# Patient Record
Sex: FEMALE | Race: WHITE | NOT HISPANIC OR LATINO | Employment: FULL TIME | URBAN - METROPOLITAN AREA
[De-identification: names, ages, dates, MRNs, and addresses within clinical notes are randomized per-mention and may not be internally consistent; named-entity substitution may affect disease eponyms.]

---

## 2018-08-28 PROCEDURE — 87624 HPV HI-RISK TYP POOLED RSLT: CPT | Performed by: OBSTETRICS & GYNECOLOGY

## 2018-08-28 PROCEDURE — G0145 SCR C/V CYTO,THINLAYER,RESCR: HCPCS | Performed by: OBSTETRICS & GYNECOLOGY

## 2018-08-29 ENCOUNTER — LAB REQUISITION (OUTPATIENT)
Dept: LAB | Facility: HOSPITAL | Age: 58
End: 2018-08-29
Payer: COMMERCIAL

## 2018-08-29 DIAGNOSIS — Z11.51 ENCOUNTER FOR SCREENING FOR HUMAN PAPILLOMAVIRUS (HPV): ICD-10-CM

## 2018-08-29 DIAGNOSIS — Z01.419 ENCOUNTER FOR GYNECOLOGICAL EXAMINATION WITHOUT ABNORMAL FINDING: ICD-10-CM

## 2018-09-04 LAB
HPV HR 12 DNA CVX QL NAA+PROBE: NEGATIVE
HPV16 DNA CVX QL NAA+PROBE: NEGATIVE
HPV18 DNA CVX QL NAA+PROBE: NEGATIVE

## 2018-09-05 LAB
LAB AP GYN PRIMARY INTERPRETATION: NORMAL
Lab: NORMAL

## 2019-09-03 ENCOUNTER — ANNUAL EXAM (OUTPATIENT)
Dept: OBGYN CLINIC | Facility: CLINIC | Age: 59
End: 2019-09-03
Payer: COMMERCIAL

## 2019-09-03 VITALS
SYSTOLIC BLOOD PRESSURE: 110 MMHG | HEIGHT: 71 IN | WEIGHT: 161.5 LBS | BODY MASS INDEX: 22.61 KG/M2 | DIASTOLIC BLOOD PRESSURE: 60 MMHG

## 2019-09-03 DIAGNOSIS — Z01.419 ENCOUNTER FOR WELL WOMAN EXAM: Primary | ICD-10-CM

## 2019-09-03 DIAGNOSIS — Z78.0 POSTMENOPAUSAL: ICD-10-CM

## 2019-09-03 DIAGNOSIS — Z12.39 SCREENING FOR BREAST CANCER: ICD-10-CM

## 2019-09-03 PROCEDURE — S0612 ANNUAL GYNECOLOGICAL EXAMINA: HCPCS | Performed by: PHYSICIAN ASSISTANT

## 2019-09-03 RX ORDER — CALCIUM CITRATE/VITAMIN D3 200MG-6.25
2 TABLET ORAL DAILY
COMMUNITY

## 2019-09-03 RX ORDER — LORAZEPAM 0.5 MG/1
TABLET ORAL
COMMUNITY
Start: 2019-06-17

## 2019-09-03 NOTE — PROGRESS NOTES
The patient is here for a yearly  The patient is not due for a pap smear  The patient has no bleeding or cramping  No vaginal or urinary issues  No breast concerns

## 2020-06-08 ENCOUNTER — OFFICE VISIT (OUTPATIENT)
Dept: URGENT CARE | Facility: CLINIC | Age: 60
End: 2020-06-08
Payer: COMMERCIAL

## 2020-06-08 VITALS
TEMPERATURE: 97.8 F | SYSTOLIC BLOOD PRESSURE: 160 MMHG | RESPIRATION RATE: 16 BRPM | DIASTOLIC BLOOD PRESSURE: 82 MMHG | HEART RATE: 76 BPM | WEIGHT: 161 LBS | HEIGHT: 72 IN | BODY MASS INDEX: 21.81 KG/M2 | OXYGEN SATURATION: 99 %

## 2020-06-08 DIAGNOSIS — N30.01 ACUTE CYSTITIS WITH HEMATURIA: ICD-10-CM

## 2020-06-08 DIAGNOSIS — R39.9 UTI SYMPTOMS: Primary | ICD-10-CM

## 2020-06-08 LAB
SL AMB  POCT GLUCOSE, UA: NEGATIVE
SL AMB LEUKOCYTE ESTERASE,UA: ABNORMAL
SL AMB POCT BILIRUBIN,UA: NEGATIVE
SL AMB POCT BLOOD,UA: ABNORMAL
SL AMB POCT CLARITY,UA: CLEAR
SL AMB POCT COLOR,UA: ABNORMAL
SL AMB POCT KETONES,UA: NEGATIVE
SL AMB POCT NITRITE,UA: NEGATIVE
SL AMB POCT PH,UA: 6
SL AMB POCT SPECIFIC GRAVITY,UA: 1.01
SL AMB POCT URINE PROTEIN: NEGATIVE
SL AMB POCT UROBILINOGEN: 0.2

## 2020-06-08 PROCEDURE — 87186 SC STD MICRODIL/AGAR DIL: CPT | Performed by: NURSE PRACTITIONER

## 2020-06-08 PROCEDURE — 81002 URINALYSIS NONAUTO W/O SCOPE: CPT | Performed by: NURSE PRACTITIONER

## 2020-06-08 PROCEDURE — 87086 URINE CULTURE/COLONY COUNT: CPT | Performed by: NURSE PRACTITIONER

## 2020-06-08 PROCEDURE — 99213 OFFICE O/P EST LOW 20 MIN: CPT | Performed by: NURSE PRACTITIONER

## 2020-06-08 PROCEDURE — 87077 CULTURE AEROBIC IDENTIFY: CPT | Performed by: NURSE PRACTITIONER

## 2020-06-08 RX ORDER — PHENAZOPYRIDINE HYDROCHLORIDE 100 MG/1
100 TABLET, FILM COATED ORAL 3 TIMES DAILY PRN
Qty: 10 TABLET | Refills: 0 | Status: SHIPPED | OUTPATIENT
Start: 2020-06-08 | End: 2020-07-11

## 2020-06-08 RX ORDER — NITROFURANTOIN 25; 75 MG/1; MG/1
100 CAPSULE ORAL 2 TIMES DAILY
Qty: 14 CAPSULE | Refills: 0 | Status: SHIPPED | OUTPATIENT
Start: 2020-06-08 | End: 2020-07-11

## 2020-06-10 LAB — BACTERIA UR CULT: ABNORMAL

## 2020-07-11 ENCOUNTER — OFFICE VISIT (OUTPATIENT)
Dept: URGENT CARE | Facility: CLINIC | Age: 60
End: 2020-07-11

## 2020-07-11 VITALS
OXYGEN SATURATION: 100 % | BODY MASS INDEX: 20.99 KG/M2 | DIASTOLIC BLOOD PRESSURE: 73 MMHG | RESPIRATION RATE: 16 BRPM | HEART RATE: 86 BPM | WEIGHT: 155 LBS | TEMPERATURE: 98 F | SYSTOLIC BLOOD PRESSURE: 132 MMHG | HEIGHT: 72 IN

## 2020-07-11 DIAGNOSIS — L03.031 CELLULITIS OF TOE OF RIGHT FOOT: Primary | ICD-10-CM

## 2020-07-11 RX ORDER — DOXYCYCLINE 100 MG/1
100 CAPSULE ORAL 2 TIMES DAILY
Qty: 14 CAPSULE | Refills: 0 | Status: SHIPPED | OUTPATIENT
Start: 2020-07-11 | End: 2020-07-18

## 2020-07-11 NOTE — PATIENT INSTRUCTIONS
Doxycycline twice a day for 7 day - take with a full glass of water and remain upright for 30 min after taking   While taking Doxycycline use sun protection as this medication can make you more sensitive to sunburn  Topical hydrocortisone cream as needed  Ice for comfort   Follow up with your PCP    Cellulitis   WHAT YOU NEED TO KNOW:   Cellulitis is a skin infection caused by bacteria  Cellulitis may go away on its own or you may need treatment  Your healthcare provider may draw a [place] around the outside edges of your cellulitis  If your cellulitis spreads, your healthcare provider will see it outside of the [place]  DISCHARGE INSTRUCTIONS:   Call 911 if:   · You have sudden trouble breathing or chest pain  Return to the emergency department if:   · Your wound gets larger and more painful  · You feel a crackling under your skin when you touch it  · You have purple dots or bumps on your skin, or you see bleeding under your skin  · You have new swelling and pain in your legs  · The red, warm, swollen area gets larger  · You see red streaks coming from the infected area  Contact your healthcare provider if:   · You have a fever  · Your fever or pain does not go away or gets worse  · The area does not get smaller after 2 days of antibiotics  · Your skin is flaking or peeling off  · You have questions or concerns about your condition or care  Medicines:   · Antibiotics  help treat the bacterial infection  · NSAIDs , such as ibuprofen, help decrease swelling, pain, and fever  NSAIDs can cause stomach bleeding or kidney problems in certain people  If you take blood thinner medicine, always ask if NSAIDs are safe for you  Always read the medicine label and follow directions  Do not give these medicines to children under 10months of age without direction from your child's healthcare provider  · Acetaminophen  decreases pain and fever  It is available without a doctor's order  Ask how much to take and how often to take it  Follow directions  Read the labels of all other medicines you are using to see if they also contain acetaminophen, or ask your doctor or pharmacist  Acetaminophen can cause liver damage if not taken correctly  Do not use more than 4 grams (4,000 milligrams) total of acetaminophen in one day  · Take your medicine as directed  Contact your healthcare provider if you think your medicine is not helping or if you have side effects  Tell him or her if you are allergic to any medicine  Keep a list of the medicines, vitamins, and herbs you take  Include the amounts, and when and why you take them  Bring the list or the pill bottles to follow-up visits  Carry your medicine list with you in case of an emergency  Self-care:   · Elevate the area above the level of your heart  as often as you can  This will help decrease swelling and pain  Prop the area on pillows or blankets to keep it elevated comfortably  · Clean the area daily until the wound scabs over  Gently wash the area with soap and water  Pat dry  Use dressings as directed  · Place cool or warm, wet cloths on the area as directed  Use clean cloths and clean water  Leave it on the area until the cloth is room temperature  Pat the area dry with a clean, dry cloth  The cloths may help decrease pain  Prevent cellulitis:   · Do not scratch bug bites or areas of injury  You increase your risk for cellulitis by scratching these areas  · Do not share personal items, such as towels, clothing, and razors  · Clean exercise equipment  with germ-killing  before and after you use it  · Wash your hands often  Use soap and water  Wash your hands after you use the bathroom, change a child's diapers, or sneeze  Wash your hands before you prepare or eat food  Use lotion to prevent dry, cracked skin  · Wear pressure stockings as directed    You may be told to wear the stockings if you have peripheral edema  The stockings improve blood flow and decrease swelling  · Treat athlete's foot  This can help prevent the spread of a bacterial skin infection  Follow up with your healthcare provider within 3 days, or as directed: Your healthcare provider will check if your cellulitis is getting better  You may need different medicine  Write down your questions so you remember to ask them during your visits  © 2017 [employer]  Information is for End User's use only and may not be sold, redistributed or otherwise used for commercial purposes  All illustrations and images included in CareNotes® are the copyrighted property of [address] , [employer]  or Kevin [employer]  The above information is an  only  It is not intended as medical advice for individual conditions or treatments  Talk to your doctor, nurse or pharmacist before following any medical regimen to see if it is safe and effective for you

## 2020-07-11 NOTE — PROGRESS NOTES
[facility]        NAME: Hesham Credit is a 63 y o  female  : 1960    MRN: [id number]  DATE: 2020  TIME: 3:54 PM    Assessment and Plan   Cellulitis of toe of right foot [L03 031]  1  Cellulitis of toe of right foot  doxycycline monohydrate (MONODOX) 100 mg capsule         Patient Instructions   Doxycycline twice a day for 7 day - take with a full glass of water and remain upright for 30 min after taking   While taking Doxycycline use sun protection as this medication can make you more sensitive to sunburn  Topical hydrocortisone cream as needed  Ice for comfort   Follow up with your PCP      Follow up with PCP in 3-5 days  Proceed to  ER if symptoms worsen  Chief Complaint     Chief Complaint   Patient presents with    Insect Bite     possible bee sting to right 3rd toe on tuesday, area red and itchy, worse at night          History of Present Illness       Patient is a 59-year-old female presenting with evaluation of her right middle toe  Four days ago she was walking with sandals on and felt a sting on a bottom of her toe  She was not able to see what stung her  The area became red and swollen the next evening  The erythema continued to spread proximally on to the foot  The area does itch  Denies fever or chills  She has been applying cortisone cream and ice with minimal improvement  Review of Systems   Review of Systems   Constitutional: Negative for activity change, chills and fever  Musculoskeletal: Positive for joint swelling  Negative for gait problem  Skin: Positive for color change  Negative for wound  Neurological: Negative for headaches  All other systems reviewed and are negative          Current Medications       Current Outpatient Medications:     LORazepam (ATIVAN) 0 5 mg tablet, , Disp: , Rfl:     Multiple Vitamins-Minerals (MULTIVITAMIN GUMMIES WOMENS) CHEW, Chew 2 tablets daily, Disp: , Rfl:     doxycycline monohydrate (MONODOX) 100 mg capsule, Take 1 capsule (100 mg total) by mouth 2 (two) times a day for 7 days, Disp: 14 capsule, Rfl: 0    Current Allergies     Allergies as of 07/11/2020 - Reviewed 07/11/2020   Allergen Reaction Noted    Penicillins Other (See Comments) 06/08/2020            The following portions of the patient's history were reviewed and updated as appropriate: allergies, current medications, past family history, past medical history, past social history, past surgical history and problem list      Past Medical History:   Diagnosis Date    Anxiety        Past Surgical History:   Procedure Laterality Date    CHOLECYSTECTOMY  2007    at [address]  01/24/2020    Repeat 3 to 5 years    COLONOSCOPY  01/26/2015       Family History   Problem Relation Age of Onset    Hypertension Mother     Hypertension Father     Pancreatic cancer Father     Stroke Father          Medications have been verified  Objective   /73   Pulse 86   Temp 98 °F (36 7 °C)   Resp 16   Ht 6' (1 829 m)   Wt 70 3 kg (155 lb)   LMP 11/01/2013 (Within Weeks)   SpO2 100%   BMI 21 02 kg/m²        Physical Exam     Physical Exam   Constitutional: She is oriented to person, place, and time  Vital signs are normal  She appears well-developed and well-nourished  She is active  No distress  Cardiovascular: Normal rate and regular rhythm  Pulmonary/Chest: Effort normal  No respiratory distress  Musculoskeletal:        Feet:    Neurological: She is alert and oriented to person, place, and time  Skin: Skin is warm, dry and intact

## 2020-12-22 ENCOUNTER — OFFICE VISIT (OUTPATIENT)
Dept: URGENT CARE | Facility: CLINIC | Age: 60
End: 2020-12-22
Payer: COMMERCIAL

## 2020-12-22 VITALS
DIASTOLIC BLOOD PRESSURE: 59 MMHG | RESPIRATION RATE: 16 BRPM | WEIGHT: 170 LBS | HEART RATE: 71 BPM | TEMPERATURE: 98 F | BODY MASS INDEX: 23.03 KG/M2 | SYSTOLIC BLOOD PRESSURE: 149 MMHG | HEIGHT: 72 IN

## 2020-12-22 DIAGNOSIS — R35.0 FREQUENCY OF MICTURITION: Primary | ICD-10-CM

## 2020-12-22 LAB
SL AMB  POCT GLUCOSE, UA: ABNORMAL
SL AMB LEUKOCYTE ESTERASE,UA: ABNORMAL
SL AMB POCT BILIRUBIN,UA: ABNORMAL
SL AMB POCT BLOOD,UA: ABNORMAL
SL AMB POCT CLARITY,UA: ABNORMAL
SL AMB POCT COLOR,UA: ABNORMAL
SL AMB POCT KETONES,UA: ABNORMAL
SL AMB POCT NITRITE,UA: ABNORMAL
SL AMB POCT PH,UA: 6
SL AMB POCT SPECIFIC GRAVITY,UA: 1.02
SL AMB POCT URINE PROTEIN: ABNORMAL
SL AMB POCT UROBILINOGEN: 0.2

## 2020-12-22 PROCEDURE — 87186 SC STD MICRODIL/AGAR DIL: CPT | Performed by: NURSE PRACTITIONER

## 2020-12-22 PROCEDURE — 87086 URINE CULTURE/COLONY COUNT: CPT | Performed by: NURSE PRACTITIONER

## 2020-12-22 PROCEDURE — 81002 URINALYSIS NONAUTO W/O SCOPE: CPT | Performed by: NURSE PRACTITIONER

## 2020-12-22 PROCEDURE — 99213 OFFICE O/P EST LOW 20 MIN: CPT | Performed by: NURSE PRACTITIONER

## 2020-12-22 PROCEDURE — 87077 CULTURE AEROBIC IDENTIFY: CPT | Performed by: NURSE PRACTITIONER

## 2020-12-22 RX ORDER — NITROFURANTOIN 25; 75 MG/1; MG/1
100 CAPSULE ORAL 2 TIMES DAILY
Qty: 14 CAPSULE | Refills: 0 | Status: SHIPPED | OUTPATIENT
Start: 2020-12-22

## 2020-12-24 LAB — BACTERIA UR CULT: ABNORMAL

## 2021-05-14 ENCOUNTER — OFFICE VISIT (OUTPATIENT)
Dept: URGENT CARE | Facility: CLINIC | Age: 61
End: 2021-05-14
Payer: COMMERCIAL

## 2021-05-14 ENCOUNTER — APPOINTMENT (EMERGENCY)
Dept: ULTRASOUND IMAGING | Facility: HOSPITAL | Age: 61
End: 2021-05-14
Payer: COMMERCIAL

## 2021-05-14 ENCOUNTER — HOSPITAL ENCOUNTER (EMERGENCY)
Facility: HOSPITAL | Age: 61
Discharge: HOME | End: 2021-05-14
Attending: EMERGENCY MEDICINE | Admitting: EMERGENCY MEDICINE
Payer: COMMERCIAL

## 2021-05-14 ENCOUNTER — APPOINTMENT (OUTPATIENT)
Dept: RADIOLOGY | Facility: CLINIC | Age: 61
End: 2021-05-14
Payer: COMMERCIAL

## 2021-05-14 VITALS
SYSTOLIC BLOOD PRESSURE: 133 MMHG | TEMPERATURE: 98.6 F | DIASTOLIC BLOOD PRESSURE: 63 MMHG | HEIGHT: 72 IN | RESPIRATION RATE: 16 BRPM | BODY MASS INDEX: 22.48 KG/M2 | HEART RATE: 77 BPM | WEIGHT: 166 LBS

## 2021-05-14 VITALS
SYSTOLIC BLOOD PRESSURE: 142 MMHG | RESPIRATION RATE: 16 BRPM | OXYGEN SATURATION: 100 % | HEART RATE: 75 BPM | DIASTOLIC BLOOD PRESSURE: 85 MMHG | TEMPERATURE: 98.7 F

## 2021-05-14 DIAGNOSIS — L03.90 CELLULITIS: ICD-10-CM

## 2021-05-14 DIAGNOSIS — M25.472 LEFT ANKLE SWELLING: Primary | ICD-10-CM

## 2021-05-14 DIAGNOSIS — M25.472 LEFT ANKLE SWELLING: ICD-10-CM

## 2021-05-14 PROCEDURE — 93971 EXTREMITY STUDY: CPT | Performed by: SURGERY

## 2021-05-14 PROCEDURE — 99284 EMERGENCY DEPT VISIT MOD MDM: CPT | Performed by: PHYSICIAN ASSISTANT

## 2021-05-14 PROCEDURE — 99213 OFFICE O/P EST LOW 20 MIN: CPT | Performed by: NURSE PRACTITIONER

## 2021-05-14 PROCEDURE — 73610 X-RAY EXAM OF ANKLE: CPT

## 2021-05-14 PROCEDURE — 93971 EXTREMITY STUDY: CPT

## 2021-05-14 PROCEDURE — 99283 EMERGENCY DEPT VISIT LOW MDM: CPT

## 2021-05-14 RX ORDER — CEPHALEXIN 500 MG/1
500 CAPSULE ORAL EVERY 6 HOURS SCHEDULED
Qty: 20 CAPSULE | Refills: 0 | Status: SHIPPED | OUTPATIENT
Start: 2021-05-14 | End: 2021-05-19

## 2021-05-14 NOTE — ED PROVIDER NOTES
History  Chief Complaint   Patient presents with    Ankle Swelling     pt reports l ankle swelling x 4 days  no known injury has factor 5 disorder told to come to ER by urgent care  Patient is a 63 y/o female with a past medical history of factor V Leiden, presenting to the ED for evaluation of left ankle swelling x4 days  Patient denies falls, trauma or injury leading up to the onset of the swelling  She denies any significant pain or difficulty with ambulation  She says pain is only minimally present with palpation of the lateral malleolus  She denies fevers, chills or other joint swelling  She denies skin breakage, wounds or known tick bites  She denies numbness, tingling or weakness of the extremity  Also denies swelling/tenderness of the calf  She has been taking Adderall and applying ice with relief  Patient was seen in urgent care and referred to the ED for a venous duplex due to concern for DVT with history of factor V Leiden and recent history of COVID  Prior to Admission Medications   Prescriptions Last Dose Informant Patient Reported? Taking? LORazepam (ATIVAN) 0 5 mg tablet  Self Yes No   Multiple Vitamins-Minerals (MULTIVITAMIN GUMMIES WOMENS) CHEW  Self Yes No   Sig: Chew 2 tablets daily   nitrofurantoin (MACROBID) 100 mg capsule  Self No No   Sig: Take 1 capsule (100 mg total) by mouth 2 (two) times a day   Patient not taking: Reported on 5/14/2021      Facility-Administered Medications: None       Past Medical History:   Diagnosis Date    Anxiety        Past Surgical History:   Procedure Laterality Date    CHOLECYSTECTOMY  2007    at [address]  01/24/2020    Repeat 3 to 5 years    COLONOSCOPY  01/26/2015       Family History   Problem Relation Age of Onset    Hypertension Mother     Hypertension Father     Pancreatic cancer Father     Stroke Father      I have reviewed and agree with the history as documented      E-Cigarette/Vaping    E-Cigarette Use Never User      E-Cigarette/Vaping Substances    Nicotine No     THC No     CBD No     Flavoring No     Other No     Unknown No      Social History     Tobacco Use    Smoking status: Never Smoker    Smokeless tobacco: Never Used   Substance Use Topics    Alcohol use: Yes     Frequency: 2-3 times a week     Drinks per session: 1 or 2     Comment: Occasional- Per Christiansonlopez Shenjaime Drug use: Not on file       Review of Systems   Constitutional: Negative for appetite change, chills, fatigue and fever  HENT: Negative for congestion, rhinorrhea, sinus pressure, sinus pain and sore throat  Eyes: Negative for photophobia and visual disturbance  Respiratory: Negative for cough, shortness of breath and wheezing  Cardiovascular: Negative for chest pain, palpitations and leg swelling  Gastrointestinal: Negative for abdominal pain, blood in stool, constipation, diarrhea, nausea and vomiting  Genitourinary: Negative for difficulty urinating, dysuria, flank pain, frequency, hematuria and urgency  Musculoskeletal: Positive for joint swelling (Left ankle)  Negative for arthralgias, back pain, myalgias and neck pain  Skin: Negative for color change, pallor and rash  Neurological: Negative for dizziness, syncope, weakness, light-headedness and headaches  Psychiatric/Behavioral: Negative for confusion and sleep disturbance  All other systems reviewed and are negative  Physical Exam  Physical Exam  Vitals signs and nursing note reviewed  Constitutional:       General: She is awake  Appearance: Normal appearance  She is well-developed  She is not toxic-appearing or diaphoretic  HENT:      Head: Normocephalic and atraumatic  Right Ear: External ear normal       Left Ear: External ear normal       Nose: Nose normal       Mouth/Throat:      Lips: Pink  Mouth: Mucous membranes are moist    Eyes:      General: Lids are normal  No scleral icterus       Conjunctiva/sclera: Conjunctivae normal       Pupils: Pupils are equal, round, and reactive to light  Neck:      Musculoskeletal: Full passive range of motion without pain and neck supple  No injury or pain with movement  Cardiovascular:      Rate and Rhythm: Normal rate and regular rhythm  Pulses: Normal pulses  Radial pulses are 2+ on the right side and 2+ on the left side  Heart sounds: Normal heart sounds, S1 normal and S2 normal    Pulmonary:      Effort: Pulmonary effort is normal  No accessory muscle usage  Breath sounds: Normal breath sounds  No stridor  No decreased breath sounds, wheezing, rhonchi or rales  Abdominal:      General: Abdomen is flat  Bowel sounds are normal  There is no distension  Palpations: Abdomen is soft  Tenderness: There is no abdominal tenderness  There is no right CVA tenderness, left CVA tenderness, guarding or rebound  Musculoskeletal:      Right lower leg: No edema  Left lower leg: No edema  Legs:         Feet:    Lymphadenopathy:      Cervical: No cervical adenopathy  Skin:     General: Skin is warm and dry  Capillary Refill: Capillary refill takes less than 2 seconds  Coloration: Skin is not cyanotic, jaundiced or pale  Neurological:      Mental Status: She is alert and oriented to person, place, and time  GCS: GCS eye subscore is 4  GCS verbal subscore is 5  GCS motor subscore is 6  Gait: Gait normal    Psychiatric:         Mood and Affect: Mood normal          Speech: Speech normal          Behavior: Behavior is cooperative           Vital Signs  ED Triage Vitals [05/14/21 1125]   Temperature Pulse Respirations Blood Pressure SpO2   98 7 °F (37 1 °C) 75 16 142/85 100 %      Temp src Heart Rate Source Patient Position - Orthostatic VS BP Location FiO2 (%)   -- Monitor Sitting Left arm --      Pain Score       2           Vitals:    05/14/21 1125   BP: 142/85   Pulse: 75   Patient Position - Orthostatic VS: Sitting Visual Acuity      ED Medications  Medications - No data to display    Diagnostic Studies  Results Reviewed     None                 VAS lower limb venous duplex study, unilateral/limited    (Results Pending)              Procedures  Procedures         ED Course  ED Course as of May 14 1721   Fri May 14, 2021   1341 Venous duplex negative for DVT  MDM  Number of Diagnoses or Management Options  Cellulitis:   Left ankle swelling:   Diagnosis management comments: Patient is a 61 y/o female with a past medical history of factor V Leiden, presenting to the ED for evaluation of left ankle swelling x4 days  Venous duplex negative for DVT  X-ray from urgent care today showed no acute osseous abnormality  Will give Keflex for what appears to be an overlying cellulitis  Advised prompt follow-up with PCP and/or return to ED for new/worsening symptoms  The management plan was discussed in detail with the patient at bedside and all questions were answered  Prior to discharge, verbal and written instructions provided  ED return precautions discussed in detail  The patient verbalized understanding of our discussion and plan of care, and agrees to return to the Emergency Department for concerns and progression of illness         Amount and/or Complexity of Data Reviewed  Tests in the radiology section of CPT®: ordered and reviewed    Patient Progress  Patient progress: stable      Disposition  Final diagnoses:   Left ankle swelling   Cellulitis     Time reflects when diagnosis was documented in both MDM as applicable and the Disposition within this note     Time User Action Codes Description Comment    5/14/2021  1:41 PM Vince Lucio [M25 472] Left ankle swelling     5/14/2021  1:42 PM Vince Lucio [L03 90] Cellulitis       ED Disposition     ED Disposition Condition Date/Time Comment    Discharge Stable Fri May 14, 2021  1:41 PM Ivonne Justin discharge to home/self care  Follow-up Information     Follow up With Specialties Details Why Contact Info Additional [address] MD Internal Medicine Schedule an appointment as soon as possible for a visit   [address] [facility] Emergency Department Emergency Medicine  If symptoms worsen [address] Emergency Department, [address], [place], [address] [facility], [address]          Discharge Medication List as of 5/14/2021  1:47 PM      START taking these medications    Details   cephalexin (KEFLEX) 500 mg capsule Take 1 capsule (500 mg total) by mouth every 6 (six) hours for 5 days, Starting Fri 5/14/2021, Until Wed 5/19/2021, Normal         CONTINUE these medications which have NOT CHANGED    Details   LORazepam (ATIVAN) 0 5 mg tablet Starting Mon 6/17/2019, Historical Med      Multiple Vitamins-Minerals (MULTIVITAMIN GUMMIES WOMENS) CHEW Chew 2 tablets daily, Historical Med      nitrofurantoin (MACROBID) 100 mg capsule Take 1 capsule (100 mg total) by mouth 2 (two) times a day, Starting Tue 12/22/2020, Normal           No discharge procedures on file      PDMP Review     None          ED Provider  Electronically Signed by           Tyron Yoder PA-C  05/14/21 7137

## 2021-05-14 NOTE — PROGRESS NOTES
[facility]        NAME: Velvet Higuera is a 62 y o  female  : 1960    MRN: [id number]  DATE: May 14, 2021  TIME: 8:52 AM    Assessment and Plan   Left ankle swelling [M25 472]  1  Left ankle swelling  XR ankle 3+ vw left     --Exam findings, risk factors (Factor V Leiden, recent COVID infection) raise possibility of superficial thrombophlebitis/DVT vs  cellulitis  Address per below  Patient Instructions     --Initial read of x-ray negative for fracture, other findings  Will call if any findings noted on final radiology report (anticipated 1-6 hours)  --Advise PCP (if able to get same day) or ER for further evaluation including ultrasound, labs r/o DVT    Chief Complaint     Chief Complaint   Patient presents with    Joint Swelling     L ankle swelling started about 4 days ago  No known injury  Some discomfort but minimal          History of Present Illness         Here with complaints of left ankle swelling x 4 days  Insiduous onset with no known injury or precipitating activity  Some tenderness and redness of lateral aspect of ankle only  No significant increase in pain with weight bearing or moving ankle  No associated fever/chills, fatigue, tick bites, arthralgias elsewhere  No numbness/tingling  No tenderness or swelling of calf, knee, or right ankle  Rates pain 2/10 at present  Advil, ice helping a bit  Denies history of gout, RA, DVT  Does have history of Factor V Leiden, however  No Rx meds including CCB's  Had COVID 3 weeks ago  On prednisone x 5 days  PCN allergy  Review of Systems   Review of Systems   Constitutional: Negative for fever  Musculoskeletal:        Per HPI   Skin: Positive for color change  Neurological: Negative for numbness           Current Medications       Current Outpatient Medications:     LORazepam (ATIVAN) 0 5 mg tablet, , Disp: , Rfl:     Multiple Vitamins-Minerals (MULTIVITAMIN GUMMIES WOMENS) CHEW, Chew 2 tablets daily, Disp: , Rfl:     nitrofurantoin (MACROBID) 100 mg capsule, Take 1 capsule (100 mg total) by mouth 2 (two) times a day (Patient not taking: Reported on 5/14/2021), Disp: 14 capsule, Rfl: 0    Current Allergies     Allergies as of 05/14/2021 - Reviewed 05/14/2021   Allergen Reaction Noted    Penicillins Other (See Comments) 06/08/2020            The following portions of the patient's history were reviewed and updated as appropriate: allergies, current medications, past family history, past medical history, past social history, past surgical history and problem list      Past Medical History:   Diagnosis Date    Anxiety        Past Surgical History:   Procedure Laterality Date    CHOLE[address]STECTOMY  2007    at [address]  01/24/2020    Repeat 3 to 5 years    COLONOSCOPY  01/26/2015       Family History   Problem Relation Age of Onset    Hypertension Mother     Hypertension Father     Pancreatic cancer Father     Stroke Father          Medications have been verified  Objective   /63 (Patient Position: Sitting)   Pulse 77   Temp 98 6 °F (37 °C) (Temporal)   Resp 16   Ht 6' (1 829 m)   Wt 75 3 kg (166 lb)   LMP 11/01/2013 (Within Weeks)   BMI 22 51 kg/m²   Patient's last menstrual period was 11/01/2013 (within weeks)  Physical Exam     Physical Exam  Constitutional:       General: She is not in acute distress  Appearance: She is not ill-appearing  Pulmonary:      Effort: Pulmonary effort is normal    Musculoskeletal: Normal range of motion  General: Swelling and tenderness present  No deformity or signs of injury  Comments: Left ankle with mild diffuse swelling, L>R side, extending partially to hindfoot  Associated faint erythema, warmth, tenderness overlying lateral malleolus    Nontender elsewhere, including medial ankle, calcaneous, midfoot, forefoot, tibia, fibula, with the exception of mildly tender, distended, tortuous varicosity located lateral aspect of distal calf about 4 cm proximal to lateral malleoli, without overlying skin changes  Left calf non-swelling, non-tender, without increased warmth or erythema  Negative Yakov  Left ankle with normal painless ROM  Negative anterior drawer, talar tilt  2+ DP pulses  Toes with normal color, temp, sensation  Non-antalgic gait  Neurological:      General: No focal deficit present  Mental Status: She is alert  Sensory: No sensory deficit     Psychiatric:         Mood and Affect: Mood normal

## 2021-05-14 NOTE — PATIENT INSTRUCTIONS
--Initial read of x-ray negative for fracture, other findings  Will call if any findings noted on final radiology report (anticipated 1-6 hours)     --Advise PCP (if able to get same day) or ER for further evaluation including ultrasound, labs r/o DVT

## 2021-05-22 ENCOUNTER — HOSPITAL ENCOUNTER (EMERGENCY)
Facility: HOSPITAL | Age: 61
Discharge: HOME | End: 2021-05-22
Attending: EMERGENCY MEDICINE
Payer: COMMERCIAL

## 2021-05-22 VITALS
HEIGHT: 72 IN | BODY MASS INDEX: 22.19 KG/M2 | WEIGHT: 163.8 LBS | HEART RATE: 83 BPM | SYSTOLIC BLOOD PRESSURE: 150 MMHG | DIASTOLIC BLOOD PRESSURE: 72 MMHG | RESPIRATION RATE: 16 BRPM | TEMPERATURE: 97.8 F | OXYGEN SATURATION: 98 %

## 2021-05-22 DIAGNOSIS — M25.472 LEFT ANKLE SWELLING: Primary | ICD-10-CM

## 2021-05-22 LAB
ALBUMIN SERPL BCP-MCNC: 3.4 G/DL (ref 3.5–5)
ALP SERPL-CCNC: 89 U/L (ref 46–116)
ALT SERPL W P-5'-P-CCNC: 33 U/L (ref 12–78)
ANION GAP SERPL CALCULATED.3IONS-SCNC: 8 MMOL/L (ref 4–13)
AST SERPL W P-5'-P-CCNC: 22 U/L (ref 5–45)
BASOPHILS # BLD AUTO: 0.03 THOUSANDS/ΜL (ref 0–0.1)
BASOPHILS NFR BLD AUTO: 1 % (ref 0–1)
BILIRUB SERPL-MCNC: 0.57 MG/DL (ref 0.2–1)
BUN SERPL-MCNC: 18 MG/DL (ref 5–25)
CALCIUM ALBUM COR SERPL-MCNC: 9.4 MG/DL (ref 8.3–10.1)
CALCIUM SERPL-MCNC: 8.9 MG/DL (ref 8.3–10.1)
CHLORIDE SERPL-SCNC: 109 MMOL/L (ref 100–108)
CO2 SERPL-SCNC: 28 MMOL/L (ref 21–32)
CREAT SERPL-MCNC: 0.68 MG/DL (ref 0.6–1.3)
EOSINOPHIL # BLD AUTO: 0.17 THOUSAND/ΜL (ref 0–0.61)
EOSINOPHIL NFR BLD AUTO: 3 % (ref 0–6)
ERYTHROCYTE [DISTWIDTH] IN BLOOD BY AUTOMATED COUNT: 13.3 % (ref 11.6–15.1)
GFR SERPL CREATININE-BSD FRML MDRD: 96 ML/MIN/1.73SQ M
GLUCOSE SERPL-MCNC: 100 MG/DL (ref 65–140)
HCT VFR BLD AUTO: 39.1 % (ref 34.8–46.1)
HGB BLD-MCNC: 12.8 G/DL (ref 11.5–15.4)
IMM GRANULOCYTES # BLD AUTO: 0.03 THOUSAND/UL (ref 0–0.2)
IMM GRANULOCYTES NFR BLD AUTO: 1 % (ref 0–2)
LYMPHOCYTES # BLD AUTO: 1.65 THOUSANDS/ΜL (ref 0.6–4.47)
LYMPHOCYTES NFR BLD AUTO: 32 % (ref 14–44)
MCH RBC QN AUTO: 31.4 PG (ref 26.8–34.3)
MCHC RBC AUTO-ENTMCNC: 32.7 G/DL (ref 31.4–37.4)
MCV RBC AUTO: 96 FL (ref 82–98)
MONOCYTES # BLD AUTO: 0.51 THOUSAND/ΜL (ref 0.17–1.22)
MONOCYTES NFR BLD AUTO: 10 % (ref 4–12)
NEUTROPHILS # BLD AUTO: 2.74 THOUSANDS/ΜL (ref 1.85–7.62)
NEUTS SEG NFR BLD AUTO: 53 % (ref 43–75)
NRBC BLD AUTO-RTO: 0 /100 WBCS
NT-PROBNP SERPL-MCNC: 93 PG/ML
PLATELET # BLD AUTO: 224 THOUSANDS/UL (ref 149–390)
PMV BLD AUTO: 9.6 FL (ref 8.9–12.7)
POTASSIUM SERPL-SCNC: 4.7 MMOL/L (ref 3.5–5.3)
PROT SERPL-MCNC: 7.1 G/DL (ref 6.4–8.2)
RBC # BLD AUTO: 4.07 MILLION/UL (ref 3.81–5.12)
SODIUM SERPL-SCNC: 145 MMOL/L (ref 136–145)
WBC # BLD AUTO: 5.13 THOUSAND/UL (ref 4.31–10.16)

## 2021-05-22 PROCEDURE — 99283 EMERGENCY DEPT VISIT LOW MDM: CPT

## 2021-05-22 PROCEDURE — 83880 ASSAY OF NATRIURETIC PEPTIDE: CPT | Performed by: PHYSICIAN ASSISTANT

## 2021-05-22 PROCEDURE — 99284 EMERGENCY DEPT VISIT MOD MDM: CPT | Performed by: PHYSICIAN ASSISTANT

## 2021-05-22 PROCEDURE — 80053 COMPREHEN METABOLIC PANEL: CPT | Performed by: PHYSICIAN ASSISTANT

## 2021-05-22 PROCEDURE — 36415 COLL VENOUS BLD VENIPUNCTURE: CPT | Performed by: PHYSICIAN ASSISTANT

## 2021-05-22 PROCEDURE — 85025 COMPLETE CBC W/AUTO DIFF WBC: CPT | Performed by: PHYSICIAN ASSISTANT

## 2021-05-22 NOTE — ED PROVIDER NOTES
History  Chief Complaint   Patient presents with    Ankle Swelling     pt currently being tx for cellulitis of L ankle, on abx for 5 days w/ no improvement      Marylou Fitzgerald is a 62 y o  female who presents to the ED with complaints of right ankle swelling  Patient endorses mild posterior calf pain  Patient was recovering from [facility] (tested positive on 04/27/2021) when she first noticed pain on the bottom of her feet which improved  Patient states shortly after she realized that the right ankle was swollen  Patient initially went to an outside urgent care and had an x-ray which was normal  Patient was seen in the ED on 05/14/2021 during which time she underwent venous duplex which was negative for DVT  Patient states at this time she was placed on Keflex which she took as prescribed with improvement of redness/warmth  Patient does not have a history of factor 5 leiden but does not have a history of DVT/PE  Patient states she is unable to follow up with PCP until next week  Denies injury, fall, numbness, tingling, weakness, repetitive activity, previous injury, previous surgery, chest pain, SOB, fever, chills  History provided by:  Patient  Leg Pain  Location:  Ankle  Time since incident:  1 week  Injury: no    Ankle location:  R ankle  Pain details:     Quality:  Aching  Associated symptoms: swelling    Associated symptoms: no back pain, no decreased ROM, no fatigue, no fever, no itching, no muscle weakness, no neck pain, no numbness, no stiffness and no tingling        Prior to Admission Medications   Prescriptions Last Dose Informant Patient Reported? Taking?    LORazepam (ATIVAN) 0 5 mg tablet  Self Yes Yes   Multiple Vitamins-Minerals (MULTIVITAMIN GUMMIES WOMENS) CHEW  Self Yes No   Sig: Chew 2 tablets daily   nitrofurantoin (MACROBID) 100 mg capsule  Self No No   Sig: Take 1 capsule (100 mg total) by mouth 2 (two) times a day   Patient not taking: Reported on 5/14/2021      Facility-Administered Medications: None       Past Medical History:   Diagnosis Date    Anxiety        Past Surgical History:   Procedure Laterality Date    CHOLECYSTECTOMY  2007    at [address]  01/24/2020    Repeat 3 to 5 years    COLONOSCOPY  01/26/2015       Family History   Problem Relation Age of Onset    Hypertension Mother     Hypertension Father     Pancreatic cancer Father     Stroke Father      I have reviewed and agree with the history as documented  E-Cigarette/Vaping    E-Cigarette Use Never User      E-Cigarette/Vaping Substances    Nicotine No     THC No     CBD No     Flavoring No     Other No     Unknown No      Social History     Tobacco Use    Smoking status: Never Smoker    Smokeless tobacco: Never Used   Substance Use Topics    Alcohol use: Yes     Frequency: 2-3 times a week     Drinks per session: 1 or 2     Comment: Occasional- Per Diana Lamb Drug use: Not on file       Review of Systems   Constitutional: Negative for appetite change, chills, fatigue, fever and unexpected weight change  HENT: Positive for postnasal drip  Negative for congestion, drooling, ear pain, rhinorrhea, sore throat, trouble swallowing and voice change  Eyes: Negative for pain, discharge, redness and visual disturbance  Respiratory: Negative for cough, shortness of breath, wheezing and stridor  Cardiovascular: Negative for chest pain, palpitations and leg swelling  Gastrointestinal: Negative for abdominal pain, blood in stool, constipation, diarrhea, nausea and vomiting  Genitourinary: Negative for dysuria, flank pain, frequency, hematuria and urgency  Musculoskeletal: Positive for arthralgias and joint swelling  Negative for back pain, gait problem, neck pain, neck stiffness and stiffness  Skin: Positive for color change  Negative for itching and rash  Neurological: Negative for dizziness, seizures, light-headedness and headaches         Physical Exam  Physical Exam  Vitals signs and nursing note reviewed  Constitutional:       Appearance: She is well-developed  HENT:      Head: Normocephalic and atraumatic  Nose: Nose normal    Eyes:      Conjunctiva/sclera: Conjunctivae normal       Pupils: Pupils are equal, round, and reactive to light  Cardiovascular:      Rate and Rhythm: Normal rate and regular rhythm  Pulmonary:      Effort: Pulmonary effort is normal       Breath sounds: Normal breath sounds  Musculoskeletal: Normal range of motion  Left ankle: She exhibits swelling  She exhibits normal range of motion and no ecchymosis  Tenderness  Medial malleolus tenderness found  No posterior TFL and no proximal fibula tenderness found  Achilles tendon normal       Comments: Mild erythema overlying the lateral aspect of the foot and ankle (patient reports this has improved and appears to have improved from pictures she provided today)  Moderate medial ankle swelling  TTP of the posterior calf/achilles  NVI  Skin:     General: Skin is warm and dry  Capillary Refill: Capillary refill takes less than 2 seconds  Neurological:      Mental Status: She is alert and oriented to person, place, and time           Vital Signs  ED Triage Vitals [05/22/21 0744]   Temperature Pulse Respirations Blood Pressure SpO2   97 8 °F (36 6 °C) 83 16 150/72 98 %      Temp Source Heart Rate Source Patient Position - Orthostatic VS BP Location FiO2 (%)   Oral Monitor Sitting Right arm --      Pain Score       3           Vitals:    05/22/21 0744   BP: 150/72   Pulse: 83   Patient Position - Orthostatic VS: Sitting         Visual Acuity      ED Medications  Medications - No data to display    Diagnostic Studies  Results Reviewed     Procedure Component Value Units Date/Time    NT-BNP PRO [[id number]]  (Normal) Collected: 05/22/21 0813    Lab Status: Final result Specimen: Blood from Arm, Right Updated: 05/22/21 0840     NT-proBNP 93 pg/mL     Comprehensive metabolic panel [[id number]]  (Abnormal) Collected: 05/22/21 0813    Lab Status: Final result Specimen: Blood from Arm, Right Updated: 05/22/21 0833     Sodium 145 mmol/L      Potassium 4 7 mmol/L      Chloride 109 mmol/L      CO2 28 mmol/L      ANION GAP 8 mmol/L      BUN 18 mg/dL      Creatinine 0 68 mg/dL      Glucose 100 mg/dL      Calcium 8 9 mg/dL      Corrected Calcium 9 4 mg/dL      AST 22 U/L      ALT 33 U/L      Alkaline Phosphatase 89 U/L      Total Protein 7 1 g/dL      Albumin 3 4 g/dL      Total Bilirubin 0 57 mg/dL      eGFR 96 ml/min/1 73sq m     Narrative:      National Kidney Disease Foundation guidelines for Chronic Kidney Disease (CKD):     Stage 1 with normal or high GFR (GFR > 90 mL/min/1 73 square meters)    Stage 2 Mild CKD (GFR = 60-89 mL/min/1 73 square meters)    Stage 3A Moderate CKD (GFR = 45-59 mL/min/1 73 square meters)    Stage 3B Moderate CKD (GFR = 30-44 mL/min/1 73 square meters)    Stage 4 Severe CKD (GFR = 15-29 mL/min/1 73 square meters)    Stage 5 End Stage CKD (GFR <15 mL/min/1 73 square meters)  Note: GFR calculation is accurate only with a steady state creatinine    CBC and differential [[id number]] Collected: 05/22/21 0813    Lab Status: Final result Specimen: Blood from Arm, Right Updated: 05/22/21 0818     WBC 5 13 Thousand/uL      RBC 4 07 Million/uL      Hemoglobin 12 8 g/dL      Hematocrit 39 1 %      MCV 96 fL      MCH 31 4 pg      MCHC 32 7 g/dL      RDW 13 3 %      MPV 9 6 fL      Platelets 955 Thousands/uL      nRBC 0 /100 WBCs      Neutrophils Relative 53 %      Immat GRANS % 1 %      Lymphocytes Relative 32 %      Monocytes Relative 10 %      Eosinophils Relative 3 %      Basophils Relative 1 %      Neutrophils Absolute 2 74 Thousands/µL      Immature Grans Absolute 0 03 Thousand/uL      Lymphocytes Absolute 1 65 Thousands/µL      Monocytes Absolute 0 51 Thousand/µL      Eosinophils Absolute 0 17 Thousand/µL      Basophils Absolute 0 03 Thousands/µL                  No orders to display Procedures  Procedures         ED Course                                           MDM  Number of Diagnoses or Management Options  Left ankle swelling: new and requires workup  Diagnosis management comments: XR Left Ankle 05/14/2021 without acute findings per my read (I reviewed this with the patient)  Patient had a recent 05/14/2021 venous duplex US of the LLE which was negative for DVT  Patient reports improvement of redness/warmth after course of antibiotics  Labs WNL  Patient will be instructed to follow up outpatient with PCP and Orthopedics  I provided patient with strict RTER precautions  I advised patient follow-up with PCP in 24-48 hours  Patient verbalized understanding  Amount and/or Complexity of Data Reviewed  Clinical lab tests: reviewed and ordered  Tests in the radiology section of CPT®: reviewed  Review and summarize past medical records: yes    Patient Progress  Patient progress: stable      Disposition  Final diagnoses:   Left ankle swelling     Time reflects when diagnosis was documented in both MDM as applicable and the Disposition within this note     Time User Action Codes Description Comment    5/22/2021  8:46 AM Liz Flakes Add [M25 471] Right ankle swelling     5/22/2021  9:03 AM Liz Flakes Remove [M25 471] Right ankle swelling     5/22/2021  9:03 AM Liz Flakes Add [M25 472] Left ankle swelling       ED Disposition     ED Disposition Condition Date/Time Comment    Discharge Stable Sat May 22, 2021  9:00 AM Mira Lockwood discharge to home/self care              Follow-up Information     Follow up With Specialties Details Why Contact Info Additional Information    [address] [facility] Emergency Department Emergency Medicine Go to  If symptoms worsen [address] Emergency Department, Manual MD [facility] Call   [address]  [address]  [address]  [facility] Orth[facility] Surgery Schedule an appointment as soon as possible for a visit   [address] [facility], [address], [address] [facility]          Discharge Medication List as of 5/22/2021  9:05 AM      CONTINUE these medications which have NOT CHANGED    Details   LORazepam (ATIVAN) 0 5 mg tablet Starting Mon 6/17/2019, Historical Med      Multiple Vitamins-Minerals (MULTIVITAMIN GUMMIES WOMENS) CHEW Chew 2 tablets daily, Historical Med      nitrofurantoin (MACROBID) 100 mg capsule Take 1 capsule (100 mg total) by mouth 2 (two) times a day, Starting Tue 12/22/2020, Normal           No discharge procedures on file      PDMP Review     None          ED Provider  Electronically Signed by           Merary Toscano PA-C  05/22/21 1002

## 2021-05-22 NOTE — DISCHARGE INSTRUCTIONS
Swollen Ankle Joint   WHAT YOU NEED TO KNOW:   A swollen ankle joint may be caused by conditions such as arthritis or gout, or by an injury  You may have other symptoms such as pain and trouble moving or putting weight on your ankle  DISCHARGE INSTRUCTIONS:   Return to the emergency department if:   · You cannot move your ankle at all  · You have severe pain that does not get better with pain medicine  Call your doctor if:   · You have a fever  · You have redness or warmth over your ankle  · The swelling does not go down with treatment  · You have questions or concerns about your condition or care  Medicines:   · NSAIDs , such as ibuprofen, help decrease swelling, pain, and fever  This medicine is available with or without a doctor's order  NSAIDs can cause stomach bleeding or kidney problems in certain people  If you take blood thinner medicine, always ask your healthcare provider if NSAIDs are safe for you  Always read the medicine label and follow directions  · Take your medicine as directed  Contact your healthcare provider if you think your medicine is not helping or if you have side effects  Tell him of her if you are allergic to any medicine  Keep a list of the medicines, vitamins, and herbs you take  Include the amounts, and when and why you take them  Bring the list or the pill bottles to follow-up visits  Carry your medicine list with you in case of an emergency  Self-care:  Treatment depends on the cause of your swollen ankle joint  Your healthcare provider may recommend any of the following:  · Rest  your ankle  Avoid activities that make the swelling or pain worse  You may need to avoid putting weight on your ankle while you have pain  Crutches or a walker can be used to avoid putting weight on your ankle  · Apply ice  on your ankle for 15 to 20 minutes every hour or as directed  Use an ice pack, or put crushed ice in a plastic bag  Cover it with a towel   Ice helps prevent tissue damage and decreases swelling and pain  · Compress  your ankle with a brace or bandage to help reduce swelling  Use a brace or bandage only as directed  · Elevate  your ankle above the level of your heart as often as you can  This will help decrease swelling and pain  Prop your joint on pillows or blankets to keep it elevated comfortably  · Apply heat  on your ankle for 20 to 30 minutes every 2 hours for as many days as directed  Heat helps decrease pain  Physical therapy:  A physical therapist teaches you exercises to help improve movement and strength, and to decrease pain  Follow up with your doctor as directed:  Write down your questions so you remember to ask them during your visits  © Copyright [address] Information is for End User's use only and may not be sold, redistributed or otherwise used for commercial purposes  All illustrations and images included in CareNotes® are the copyrighted property of [employer]  or [facility] Leny Drew   The above information is an  only  It is not intended as medical advice for individual conditions or treatments  Talk to your doctor, nurse or pharmacist before following any medical regimen to see if it is safe and effective for you

## 2021-08-04 ENCOUNTER — ANNUAL EXAM (OUTPATIENT)
Dept: OBGYN CLINIC | Facility: CLINIC | Age: 61
End: 2021-08-04
Payer: COMMERCIAL

## 2021-08-04 VITALS
BODY MASS INDEX: 22.08 KG/M2 | WEIGHT: 163 LBS | SYSTOLIC BLOOD PRESSURE: 116 MMHG | DIASTOLIC BLOOD PRESSURE: 78 MMHG | HEIGHT: 72 IN

## 2021-08-04 DIAGNOSIS — Z01.419 ENCOUNTER FOR WELL WOMAN EXAM: Primary | ICD-10-CM

## 2021-08-04 DIAGNOSIS — Z12.31 ENCOUNTER FOR SCREENING MAMMOGRAM FOR BREAST CANCER: ICD-10-CM

## 2021-08-04 DIAGNOSIS — Z78.0 POSTMENOPAUSAL: ICD-10-CM

## 2021-08-04 DIAGNOSIS — Z12.39 ENCOUNTER FOR SCREENING BREAST EXAMINATION: ICD-10-CM

## 2021-08-04 PROCEDURE — S0612 ANNUAL GYNECOLOGICAL EXAMINA: HCPCS | Performed by: PHYSICIAN ASSISTANT

## 2021-08-04 NOTE — PROGRESS NOTES
Patient is here for yearly exam   Patient has no bleeding or pelvic pain  No breast concerns and B&B ok  Patient is not due for a pap smear at this visit  8/28/18 Normal Pap, Negative HPV    9/19/19 Normal Mammo  1/24/20 Negative Colonoscopy

## 2021-08-04 NOTE — PROGRESS NOTES
Assessment/Plan:    No problem-specific Assessment & Plan notes found for this encounter  Diagnoses and all orders for this visit:    Encounter for well woman exam    Encounter for screening breast examination    Encounter for screening mammogram for breast cancer  -     Mammo screening bilateral w 3d & cad; Future    Postmenopausal          Subjective:      Patient ID: Shira Michele is a 62 y o  female  Pt presents for her annual exam today--  She has no gyn complaints  Has noticed some hair loss over the last 5--6 weeks  pcp did bloodwork  Thyroid is borderline  D is low  Pt has had some increased stress  On biotin and d  She has no bleeding or pelvic pain  Bowel and bladder are regular  Colonoscopy--1/2020  No breast concerns today  Last mammo--2019      No pap today  rx mammo        The following portions of the patient's history were reviewed and updated as appropriate: allergies, current medications, past family history, past medical history, past social history, past surgical history and problem list     Review of Systems   Constitutional: Negative for chills, fever and unexpected weight change  Gastrointestinal: Negative for abdominal pain, blood in stool, constipation and diarrhea  Genitourinary: Negative  Objective:      /78   Ht 6' (1 829 m)   Wt 73 9 kg (163 lb)   LMP 11/01/2013 (Within Weeks)   BMI 22 11 kg/m²          Physical Exam  Vitals and nursing note reviewed  Constitutional:       Appearance: She is well-developed  HENT:      Head: Normocephalic and atraumatic  Chest:      Breasts:         Right: No inverted nipple, mass, nipple discharge or skin change  Left: No inverted nipple, mass, nipple discharge or skin change  Abdominal:      Palpations: Abdomen is soft  Genitourinary:     Exam position: Supine  Labia:         Right: No rash, tenderness or lesion  Left: No rash, tenderness or lesion         Vagina: Normal       Cervix: No cervical motion tenderness, discharge or friability  Adnexa:         Right: No mass, tenderness or fullness  Left: No mass, tenderness or fullness  Musculoskeletal:      Cervical back: Normal range of motion  Lymphadenopathy:      Lower Body: No right inguinal adenopathy  No left inguinal adenopathy

## 2024-05-21 ENCOUNTER — ANNUAL EXAM (OUTPATIENT)
Dept: GYNECOLOGY | Facility: CLINIC | Age: 64
End: 2024-05-21
Payer: COMMERCIAL

## 2024-05-21 VITALS
BODY MASS INDEX: 23.16 KG/M2 | SYSTOLIC BLOOD PRESSURE: 124 MMHG | HEIGHT: 72 IN | WEIGHT: 171 LBS | DIASTOLIC BLOOD PRESSURE: 70 MMHG

## 2024-05-21 DIAGNOSIS — Z12.4 CERVICAL CANCER SCREENING: ICD-10-CM

## 2024-05-21 DIAGNOSIS — Z01.419 ENCOUNTER FOR WELL WOMAN EXAM: Primary | ICD-10-CM

## 2024-05-21 DIAGNOSIS — Z01.419 ROUTINE GYNECOLOGICAL EXAMINATION: ICD-10-CM

## 2024-05-21 DIAGNOSIS — Z11.51 SCREENING FOR HPV (HUMAN PAPILLOMAVIRUS): ICD-10-CM

## 2024-05-21 DIAGNOSIS — Z12.31 SCREENING MAMMOGRAM FOR BREAST CANCER: ICD-10-CM

## 2024-05-21 DIAGNOSIS — Z12.39 ENCOUNTER FOR SCREENING BREAST EXAMINATION: ICD-10-CM

## 2024-05-21 DIAGNOSIS — Z78.0 POSTMENOPAUSAL: ICD-10-CM

## 2024-05-21 PROCEDURE — 99396 PREV VISIT EST AGE 40-64: CPT | Performed by: PHYSICIAN ASSISTANT

## 2024-05-23 NOTE — PROGRESS NOTES
Assessment/Plan:      Diagnoses and all orders for this visit:    Encounter for well woman exam    Encounter for screening breast examination    Cervical cancer screening    Screening mammogram for breast cancer  -     Mammo screening bilateral w 3d & cad; Future    Routine gynecological examination  -     GP Pap Dependent HPV Cotest >= 30 years old    Screening for HPV (human papillomavirus)  -     GP Pap Dependent HPV Cotest >= 30 years old    Postmenopausal          Subjective:     Patient ID: Neha Tomas is a 63 y.o. female.    Pt presents for her annual exam today--  She has no complaints  She has no bleeding or pelvic pain--postmeno  Bowel and bladder are regular  Colonoscopy--2020  No breast concerns today  Last mammo--1/2024    pap today.   Rx mammo  Daily ca, d        Review of Systems   Constitutional:  Negative for chills, fever and unexpected weight change.   HENT:  Negative for ear pain and sore throat.    Eyes:  Negative for pain and visual disturbance.   Respiratory:  Negative for cough and shortness of breath.    Cardiovascular:  Negative for chest pain and palpitations.   Gastrointestinal:  Negative for abdominal pain, blood in stool, constipation, diarrhea and vomiting.   Genitourinary: Negative.  Negative for dysuria and hematuria.   Musculoskeletal:  Negative for arthralgias and back pain.   Skin:  Negative for color change and rash.   Neurological:  Negative for seizures and syncope.   All other systems reviewed and are negative.        Objective:     Physical Exam  Vitals and nursing note reviewed.   Constitutional:       Appearance: Normal appearance. She is well-developed.   HENT:      Head: Normocephalic and atraumatic.   Chest:   Breasts:     Right: No inverted nipple, mass, nipple discharge or skin change.      Left: No inverted nipple, mass, nipple discharge or skin change.   Abdominal:      Palpations: Abdomen is soft.   Genitourinary:     General: Normal vulva.      Exam position:  Supine.      Labia:         Right: No rash, tenderness or lesion.         Left: No rash, tenderness or lesion.       Vagina: Normal.      Cervix: No cervical motion tenderness, discharge or friability.      Adnexa:         Right: No mass, tenderness or fullness.          Left: No mass, tenderness or fullness.     Musculoskeletal:      Cervical back: Normal range of motion.   Lymphadenopathy:      Lower Body: No right inguinal adenopathy. No left inguinal adenopathy.   Neurological:      Mental Status: She is alert.

## 2024-05-24 LAB
HPV HR 12 DNA CVX QL NAA+PROBE: NOT DETECTED
HPV16 DNA SPEC QL NAA+PROBE: NOT DETECTED
HPV18 DNA SPEC QL NAA+PROBE: NOT DETECTED
THIN PREP CVX: NORMAL

## 2025-06-13 ENCOUNTER — ANNUAL EXAM (OUTPATIENT)
Dept: OBGYN CLINIC | Facility: CLINIC | Age: 65
End: 2025-06-13
Payer: COMMERCIAL

## 2025-06-13 VITALS
SYSTOLIC BLOOD PRESSURE: 128 MMHG | HEIGHT: 72 IN | BODY MASS INDEX: 22.13 KG/M2 | WEIGHT: 163.4 LBS | DIASTOLIC BLOOD PRESSURE: 78 MMHG

## 2025-06-13 DIAGNOSIS — Z78.0 POSTMENOPAUSAL: ICD-10-CM

## 2025-06-13 DIAGNOSIS — Z12.31 ENCOUNTER FOR SCREENING MAMMOGRAM FOR MALIGNANT NEOPLASM OF BREAST: ICD-10-CM

## 2025-06-13 DIAGNOSIS — Z01.419 ENCOUNTER FOR WELL WOMAN EXAM: Primary | ICD-10-CM

## 2025-06-13 DIAGNOSIS — Z12.11 SCREENING FOR COLON CANCER: ICD-10-CM

## 2025-06-13 DIAGNOSIS — Z01.419 WELL WOMAN EXAM WITH ROUTINE GYNECOLOGICAL EXAM: ICD-10-CM

## 2025-06-13 DIAGNOSIS — Z12.39 ENCOUNTER FOR SCREENING BREAST EXAMINATION: ICD-10-CM

## 2025-06-13 PROCEDURE — S0612 ANNUAL GYNECOLOGICAL EXAMINA: HCPCS | Performed by: PHYSICIAN ASSISTANT

## 2025-06-13 RX ORDER — METHYLPREDNISOLONE 4 MG/1
4 TABLET ORAL DAILY
COMMUNITY
Start: 2025-04-19

## 2025-06-13 RX ORDER — TRIAMCINOLONE ACETONIDE 0.25 MG/G
OINTMENT TOPICAL 2 TIMES DAILY
COMMUNITY
Start: 2025-03-13

## 2025-06-13 RX ORDER — METHOCARBAMOL 500 MG/1
500 TABLET, FILM COATED ORAL
COMMUNITY
Start: 2025-04-19

## 2025-06-13 RX ORDER — LISINOPRIL 20 MG/1
20 TABLET ORAL DAILY
COMMUNITY

## 2025-06-13 NOTE — PROGRESS NOTES
:  Assessment & Plan  Well woman exam with routine gynecological exam    Orders:    Ambulatory referral to Gastroenterology; Future    Mammo screening bilateral w 3d and cad    Encounter for well woman exam         Encounter for screening breast examination         Postmenopausal         Encounter for screening mammogram for malignant neoplasm of breast    Orders:    Mammo screening bilateral w 3d and cad    Screening for colon cancer    Orders:    Ambulatory referral to Gastroenterology; Future        History of Present Illness     Nhea Tomas is a 64 y.o. female   Pt presents for her annual exam today--  She has no gyn complaints  She has no bleeding or pelvic pain--postmeno  Bowel and bladder are regular  Colonoscopy--due  No breast concerns today  Last mammo--25    No pap today.    Rx mammo  Rx colon  Daily ca, d      Review of Systems   Constitutional:  Negative for chills, fever and unexpected weight change.   HENT:  Negative for ear pain and sore throat.    Eyes:  Negative for pain and visual disturbance.   Respiratory:  Negative for cough and shortness of breath.    Cardiovascular:  Negative for chest pain and palpitations.   Gastrointestinal:  Negative for abdominal pain, blood in stool, constipation, diarrhea and vomiting.   Genitourinary: Negative.  Negative for dysuria and hematuria.   Musculoskeletal:  Negative for arthralgias and back pain.   Skin:  Negative for color change and rash.   Neurological:  Negative for seizures and syncope.   All other systems reviewed and are negative.    Objective   /78 (BP Location: Left arm, Patient Position: Sitting, Cuff Size: Standard)   Ht 6' (1.829 m)   Wt 74.1 kg (163 lb 6.4 oz)   LMP 11/01/2013 (Within Weeks)   BMI 22.16 kg/m²      Physical Exam  Vitals and nursing note reviewed.   Constitutional:       General: She is not in acute distress.     Appearance: Normal appearance. She is well-developed and normal weight.   HENT:      Head: Normocephalic  and atraumatic.     Eyes:      Conjunctiva/sclera: Conjunctivae normal.       Cardiovascular:      Rate and Rhythm: Normal rate and regular rhythm.      Heart sounds: No murmur heard.  Pulmonary:      Effort: Pulmonary effort is normal. No respiratory distress.      Breath sounds: Normal breath sounds.   Chest:   Breasts:     Right: Normal.      Left: Normal.   Abdominal:      Palpations: Abdomen is soft.      Tenderness: There is no abdominal tenderness.   Genitourinary:     General: Normal vulva.      Vagina: Normal.      Cervix: Normal.      Uterus: Normal.       Adnexa: Right adnexa normal and left adnexa normal.      Rectum: Normal.     Musculoskeletal:         General: No swelling.      Cervical back: Neck supple.     Skin:     General: Skin is warm and dry.      Capillary Refill: Capillary refill takes less than 2 seconds.     Neurological:      Mental Status: She is alert.     Psychiatric:         Mood and Affect: Mood normal.